# Patient Record
Sex: MALE | Race: WHITE | NOT HISPANIC OR LATINO | Employment: PART TIME | URBAN - METROPOLITAN AREA
[De-identification: names, ages, dates, MRNs, and addresses within clinical notes are randomized per-mention and may not be internally consistent; named-entity substitution may affect disease eponyms.]

---

## 2022-09-06 ENCOUNTER — HOSPITAL ENCOUNTER (EMERGENCY)
Facility: HOSPITAL | Age: 22
Discharge: HOME | End: 2022-09-06
Attending: EMERGENCY MEDICINE | Admitting: EMERGENCY MEDICINE

## 2022-09-06 VITALS
OXYGEN SATURATION: 98 % | TEMPERATURE: 98 F | DIASTOLIC BLOOD PRESSURE: 74 MMHG | RESPIRATION RATE: 12 BRPM | HEART RATE: 68 BPM | SYSTOLIC BLOOD PRESSURE: 119 MMHG

## 2022-09-06 DIAGNOSIS — T78.40XA ALLERGIC REACTION, INITIAL ENCOUNTER: Primary | ICD-10-CM

## 2022-09-06 PROCEDURE — 63710000001 PREDNISONE PER 1 MG: Performed by: EMERGENCY MEDICINE

## 2022-09-06 PROCEDURE — 99283 EMERGENCY DEPT VISIT LOW MDM: CPT

## 2022-09-06 RX ORDER — PREDNISONE 20 MG/1
40 TABLET ORAL DAILY
Qty: 10 TABLET | Refills: 0 | Status: SHIPPED | OUTPATIENT
Start: 2022-09-06 | End: 2022-09-11

## 2022-09-06 RX ORDER — EPINEPHRINE 0.3 MG/.3ML
0.3 INJECTION SUBCUTANEOUS ONCE
Qty: 1 EACH | Refills: 0 | Status: SHIPPED | OUTPATIENT
Start: 2022-09-06 | End: 2022-09-06

## 2022-09-06 RX ORDER — PREDNISONE 20 MG/1
40 TABLET ORAL ONCE
Status: COMPLETED | OUTPATIENT
Start: 2022-09-06 | End: 2022-09-06

## 2022-09-06 RX ORDER — PREDNISONE 20 MG/1
40 TABLET ORAL DAILY
Qty: 10 TABLET | Refills: 0 | Status: SHIPPED | OUTPATIENT
Start: 2022-09-06 | End: 2022-09-06 | Stop reason: SDUPTHER

## 2022-09-06 RX ORDER — EPINEPHRINE 0.3 MG/.3ML
0.3 INJECTION SUBCUTANEOUS ONCE
Qty: 1 EACH | Refills: 0 | Status: SHIPPED | OUTPATIENT
Start: 2022-09-06 | End: 2022-09-06 | Stop reason: SDUPTHER

## 2022-09-06 RX ADMIN — PREDNISONE 40 MG: 20 TABLET ORAL at 20:46

## 2022-09-07 NOTE — ED TRIAGE NOTES
To ER via EMS.  C/o rash, itching, scratchy throat since this am.  Unknown allergen.    Pt in mask at time of triage.  Triage staff in appropriate PPE.

## 2022-09-07 NOTE — DISCHARGE INSTRUCTIONS
Take steroids as prescribed until they are gone.  Use Benadryl per the package instructions to help with your itching.  Return here immediately for any shortness of breath, worsened rash, or getting lightheaded when you stand.  Use the EpiPen and come to the hospital immediately for any severe allergic reaction in the future.

## 2022-09-07 NOTE — ED PROVIDER NOTES
EMERGENCY DEPARTMENT ENCOUNTER    Room Number:  31/31  Date of encounter:  9/6/2022  PCP: Provider, No Known  Patient Care Team:  Provider, No Known as PCP - General   Historian: Patient, family    HPI:  Chief Complaint: Rash  A complete HPI/ROS/PMH/PSH/SH/FH are unobtainable due to: Nothing    Context: Abner Cheney is a 22 y.o. male who presents to the ED c/o developing a rash this morning before work.  He reports around 5:30 in the morning he developed a rash to his left shoulder.  He reports that it has been itching.  He states that it is progressed throughout the day to include his extremities and torso.  He denies fevers.  He reports he had a little bit of itching throat.  He took Benadryl with some improvement.  He reports tonight around 730 he developed somewhat worsened symptoms.  He denies any prior similar episodes.  He denies any prior logic reactions.  He states all of this started after eating a muffin this morning.    Prior record review: No prior records in our system    PAST MEDICAL HISTORY  Active Ambulatory Problems     Diagnosis Date Noted   • No Active Ambulatory Problems     Resolved Ambulatory Problems     Diagnosis Date Noted   • No Resolved Ambulatory Problems     Past Medical History:   Diagnosis Date   • Depression        The patient qualifies to receive the vaccine, but they have not yet received it.    PAST SURGICAL HISTORY  History reviewed. No pertinent surgical history.      FAMILY HISTORY  History reviewed. No pertinent family history.      SOCIAL HISTORY  Social History     Socioeconomic History   • Marital status: Single   Tobacco Use   • Smoking status: Current Every Day Smoker     Packs/day: 0.50     Types: Cigarettes   • Smokeless tobacco: Never Used   Vaping Use   • Vaping Use: Never used   Substance and Sexual Activity   • Alcohol use: Yes     Comment: Socially - once a month   • Drug use: Never   • Sexual activity: Defer         ALLERGIES  Serotonin reuptake inhibitors  (ssris)        REVIEW OF SYSTEMS  Review of Systems   No chest pain, no shortness of breath, no fever, no chills, no nausea, no vomiting, no headache  All systems reviewed and negative except for those discussed in HPI.       PHYSICAL EXAM    I have reviewed the triage vital signs and nursing notes.    ED Triage Vitals   Temp Heart Rate Resp BP SpO2   09/06/22 2006 09/06/22 2006 09/06/22 2006 09/06/22 2006 09/06/22 2006   98 °F (36.7 °C) 87 18 120/83 99 %      Temp src Heart Rate Source Patient Position BP Location FiO2 (%)   -- 09/06/22 2032 09/06/22 2032 09/06/22 2032 --    Monitor Lying Right arm        Physical Exam  GENERAL: Awake, alert, no acute distress  SKIN: Warm, dry, nonspecific rash to the left shoulder, mild to the chest.  Nothing on the lower legs.  No umbilicated lesions.  HENT: Normocephalic, atraumatic, no lymphadenopathy in the neck or axilla  EYES: no scleral icterus  CV: regular rhythm, regular rate  RESPIRATORY: normal effort, lungs clear  ABDOMEN: soft, nontender, nondistended  MUSCULOSKELETAL: no deformity  NEURO: alert, moves all extremities, follows commands          LAB RESULTS  No results found for this or any previous visit (from the past 24 hour(s)).    Ordered the above labs and independently reviewed the results.        RADIOLOGY  No Radiology Exams Resulted Within Past 24 Hours    I ordered the above noted radiological studies. Reviewed by me and discussed with radiologist.  See dictation for official radiology interpretation.      PROCEDURES    Procedures      MEDICATIONS GIVEN IN ER    Medications   predniSONE (DELTASONE) tablet 40 mg (40 mg Oral Given 9/6/22 2046)         PROGRESS, DATA ANALYSIS, CONSULTS, AND MEDICAL DECISION MAKING    All labs have been independently reviewed by me.  All radiology studies have been reviewed by me and discussed with radiologist dictating the report.   EKG's independently viewed and interpreted by me.  Discussion below represents my analysis of  pertinent findings related to patient's condition, differential diagnosis, treatment plan and final disposition.    Differential diagnosis includes but is not limited to allergic reaction, food allergies, viral syndrome.    ED Course as of 09/06/22 2059   Tue Sep 06, 2022   2047 The rash is mild.  Given that he is having subjectively worsened symptoms since this morning despite Benadryl I will start him on steroids for the next couple of days.  He has no signs of anaphylaxis or rapidly worsening symptoms.  His voice is clear.  He has no stridor or drooling.  He is resting comfortably. [TR]      ED Course User Index  [TR] Monico Matson MD           PPE: The patient wore a mask and I wore an N95 mask throughout the entire patient encounter.       AS OF 20:59 EDT VITALS:    BP - 137/83  HR - 76  TEMP - 98 °F (36.7 °C)  O2 SATS - 98%        DIAGNOSIS  Final diagnoses:   Allergic reaction, initial encounter         DISPOSITION  ED Disposition     ED Disposition   Discharge    Condition   Stable    Comment   --                Note Disclaimer: At [facility], we believe that sharing information builds trust and better relationships. You are receiving this note because you recently visited [facility]. It is possible you will see health information before a provider has talked with you about it. This kind of information can be easy to misunderstand. To help you fully understand what it means for your health, we urge you to discuss this note with your provider.       Monico Matson MD  09/06/22 2059